# Patient Record
Sex: MALE | Race: WHITE
[De-identification: names, ages, dates, MRNs, and addresses within clinical notes are randomized per-mention and may not be internally consistent; named-entity substitution may affect disease eponyms.]

---

## 2017-10-02 ENCOUNTER — HOSPITAL ENCOUNTER (OUTPATIENT)
Dept: HOSPITAL 53 - M SLEEP HO | Age: 62
End: 2017-10-02
Attending: NURSE PRACTITIONER
Payer: COMMERCIAL

## 2017-10-02 DIAGNOSIS — G47.30: Primary | ICD-10-CM

## 2017-10-06 NOTE — SLEEPHOME
DATE OF PROCEDURE:  10/02/2017

 

REFERRING PHYSICIAN: Liliana Gutierrez

 

INTERPRETATION: Diagnostic home sleep testing was performed due to concern for

the obstructive sleep apnea syndrome in this patient with a history of excessive

somnolence and snoring.

 

For testing, a NOX-T3 respiratory monitoring device was used. Continuous record

was made of pulse, oxygen saturation, airflow, chest and abdominal strain and

body position.

 

10 hours and 59 minutes of data were reviewed. There were 7 hours and 9 minutes

marked as time in bed. During the interval marked time in bed, there were 37

respiratory events identified of 10 seconds in duration or greater for a

respiratory event index of 5.2. The events were primary obstructive. Baseline

pulse rate 64 beats per minute. Pulse rate ranged 54 to 92. Baseline saturation

92%. Lowest oxygen saturation 88%. Testing was performed in primarily the

nonsupine position.

 

IMPRESSION:

Abnormal home sleep testing with repetitive respiratory events and oxygen

desaturations to 88% with a respiratory event index of 5.2 consistent with the

obstructive sleep apnea syndrome.

 

RECOMMENDATION:

The patient should be referred for formal sleep evaluation and in laboratory

pressure titration.

## 2022-11-06 ENCOUNTER — HOSPITAL ENCOUNTER (OUTPATIENT)
Dept: HOSPITAL 53 - M LABSMTC | Age: 67
End: 2022-11-06
Attending: ANESTHESIOLOGY
Payer: COMMERCIAL

## 2022-11-06 DIAGNOSIS — Z01.812: Primary | ICD-10-CM

## 2022-11-06 DIAGNOSIS — Z20.822: ICD-10-CM

## 2022-11-10 ENCOUNTER — HOSPITAL ENCOUNTER (OUTPATIENT)
Dept: HOSPITAL 53 - M OPP | Age: 67
Discharge: HOME | End: 2022-11-10
Attending: SURGERY
Payer: COMMERCIAL

## 2022-11-10 VITALS — HEIGHT: 70 IN | BODY MASS INDEX: 27.92 KG/M2 | WEIGHT: 195 LBS

## 2022-11-10 VITALS — DIASTOLIC BLOOD PRESSURE: 88 MMHG | SYSTOLIC BLOOD PRESSURE: 117 MMHG

## 2022-11-10 DIAGNOSIS — G47.30: ICD-10-CM

## 2022-11-10 DIAGNOSIS — F32.9: ICD-10-CM

## 2022-11-10 DIAGNOSIS — Z12.11: Primary | ICD-10-CM

## 2022-11-10 DIAGNOSIS — F41.9: ICD-10-CM

## 2022-11-10 DIAGNOSIS — Z79.899: ICD-10-CM

## 2022-11-10 DIAGNOSIS — Z99.89: ICD-10-CM
